# Patient Record
Sex: MALE | Race: WHITE
[De-identification: names, ages, dates, MRNs, and addresses within clinical notes are randomized per-mention and may not be internally consistent; named-entity substitution may affect disease eponyms.]

---

## 2018-02-07 ENCOUNTER — HOSPITAL ENCOUNTER (OUTPATIENT)
Dept: HOSPITAL 74 - JASU-SURG | Age: 68
Discharge: HOME | End: 2018-02-07
Attending: ORTHOPAEDIC SURGERY
Payer: COMMERCIAL

## 2018-02-07 VITALS — TEMPERATURE: 98.1 F

## 2018-02-07 VITALS — SYSTOLIC BLOOD PRESSURE: 117 MMHG | HEART RATE: 86 BPM | DIASTOLIC BLOOD PRESSURE: 77 MMHG

## 2018-02-07 VITALS — BODY MASS INDEX: 26.6 KG/M2

## 2018-02-07 DIAGNOSIS — S83.232A: Primary | ICD-10-CM
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
## 2018-02-07 DIAGNOSIS — M17.12: ICD-10-CM
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
## 2018-02-07 PROCEDURE — 0SBD4ZZ EXCISION OF LEFT KNEE JOINT, PERCUTANEOUS ENDOSCOPIC APPROACH: ICD-10-PCS | Performed by: ORTHOPAEDIC SURGERY

## 2018-02-07 NOTE — OP
Operative Note





- Note:


Operative Date: 02/07/18 ([facility])


Pre-Operative Diagnosis: left knee internal derangement


Operation: left knee arthroscopy ith PMM, debridement chondroplasty trochlea, 

synovectomy


Post-Operative Diagnosis: Same as Pre-op


Surgeon: Derrell Alexandra


Anesthesia: General, Local


Specimens Removed: shavings


Estimated Blood Loss (mls): 5


Operative Report Dictated: Yes

## 2018-02-07 NOTE — HP
Satellite [facility]





- Chief Complaint


Chief Complaint: left knee pain





- Past Medical History


Allergies/Adverse Reactions: 


 Allergies











Allergy/AdvReac Type Severity Reaction Status Date / Time


 


No Known Allergies Allergy   Verified 02/07/18 06:41














- Current Medications


Current Medications: 


 Home Medications











 Medication  Instructions  Recorded


 


Rosuvastatin [Crestor -] 10 mg PO DAILY 02/02/18


 


Zolpidem Tartrate [Ambien] 10 mg PO PRN PRN 02/02/18


 


Hydrocodone/Acetaminophen [Norco 1 each PO Q6H PRN #20 tablet MDD 4 02/07/18





5-325 Tablet]  














Satellite Physical Exam





- Physical Examination


Vital Signs: 


 Vital Signs











 Period  Temp  Pulse  Resp  BP Sys/Jimenes  Pulse Ox


 


 Last 24 Hr  98.0 F  77  20  137/70  97











General Appearance: Well Nourished, Well Developed, Alert & Oriented x3


ENT: Clear


Lung: Normal air movement


Heart: Regular rate & rhythm


Extremities: Other (left knee- + swelling, + ttp, dec rom, nvi MRI + mmt)


Neurological: Intact, Alert, Oriented





Satellite Impression/Plan





- Impression/Plan


Impression: left knee internal derangement


Operative Procedure: left knee arthroscopy


Date to be Performed: 02/07/18

## 2018-02-07 NOTE — OP
DATE OF OPERATION:  02/07/2018 

 

PREOPERATIVE DIAGNOSIS:  Left knee medial meniscus tear and osteoarthritis.

 

POSTOPERATIVE DIAGNOSIS:  Left knee medial meniscus tear and osteoarthritis.

 

SURGICAL PROCEDURE:  Left knee arthroscopy, partial medial meniscectomy, and

debridement chondroplasty.  

 

SURGEON:  Derrell Mccartney MD 

 

ASSISTANT:  None.

 

ANESTHETIST:  _____ 

 

ANESTHESIA:  LMA and intraarticular injection of 20 mL 0.5% Marcaine.

 

DRAINS:  None.

 

SPECIMENS:  Arthroscopic shavings.  

 

BLOOD LOSS:  None.

 

BLOOD GIVEN:  None.

 

FLUID REPLACEMENT:  500 mL.  

 

INDICATION FOR PROCEDURE:  This patient is a 68-year-old male with a preoperative

diagnosis of left knee pain and medial meniscus tear and osteoarthritis.  After

understanding the potential risks, complications, alternatives, and benefits to

surgery versus nonsurgical treatment, the patient elected to undergo this procedure. 

 

 

DESCRIPTION OF PROCEDURE:  Patient was brought to the operating room, peripheral IV

placed, IV sedation given.  IV Ancef 2 g was given.  LMA anesthesia was induced. 

Ample Webril was placed around the left thigh, and the tourniquet was applied.  The

styrofoam ring was applied.  The patient's left lower extremity was placed into

C-clamp _____ with ample padding throughout.  Was prepped and draped in sterile

fashion.  It was elevated and exsanguinated with an Esmarch bandage, tourniquet

inflated to 275 mmHg.  

 

A superomedial outflow portal was established.  A lateral portal was established.  An

arthroscope was introduced into the joint.  Under direct visualization a medial

portal was established.  The patient was seen to have significant grade 4

osteoarthritis of the medial femoral condyle and the medial tibial plateau and a

complex tear of the posterior horn of the medial meniscus.  A curved shaver was

introduced into the joint.  A partial medial meniscectomy was performed and a

debridement chondroplasty in the medial compartment.  Photographs were taken before

and after.  

 

Intercondylar notch had a lot of synovitis and scar tissue.  This was removed.  The

lateral compartment, the meniscus looked good.  There was grade 2 osteoarthritis of

the lateral tibial plateau, grade 1 changes of the lateral femoral condyle, but

overall lateral compartment looked good.  

 

The patellofemoral joint had significant scar tissue from 2 previous quadriceps

tendon repair surgeries and significant grade 4 trochlear osteoarthritis.  A

debridement chondroplasty was performed here as well as a synovectomy and removal of

scar tissue.  The area was copiously irrigated and washed out, all instrumentation

and excess saline removed.  The arthroscopy portals were closed with 3-0 nylon

sutures.  The area was then washed and dried, covered with Xeroform, 4 x 4 gauze,

Webril, and Ace bandage.  Total tourniquet time was 20 minutes.  There were no

complications during the case and the patient tolerated the procedure well and was

brought to the ambulatory recovery room in stable condition.  

 

 

DERRELL MCCARTNEY M.D.

 

[id number]

DD: 02/07/2018 08:57

DT: 02/07/2018 09:43

Job #:  [id number]

## 2018-02-08 NOTE — PATH
Surgical Pathology Report



Patient Name:  JAMARI CARDOOZ

Accession #:  

Med. Rec. #:  [id number]                                                        

   /Age/Gender:  1950 (Age: 68) / M

Account:  [id number]                                                          

             Location: [facility] SURGICAL

Taken:  2018

Received:  2018

Reported:  2018

Physicians:  Derrell Alexandra M.D.

  



Specimen(s) Received

 LEF TKNEE SHAVINGS 





Clinical History

Meniscus tear left knee







Final Diagnosis

KNEE, LEFT, ARTHROSCOPIC SHAVING: 

FIBROCARTILAGE WITH MYXOID DEGENERATIVE CHANGES, ALONG WITH PORTIONS OF

SYNOVIUM.





***Electronically Signed***

Aaron Stoddard M.D.





Gross Description

Received in formalin, labeled "left knee shavings," is a 4.5 x 4.2 x 0.3 cm.

aggregate of tan-yellow soft tissue fragments. A representative portion is

submitted in one cassette.

/2018

## 2018-03-14 ENCOUNTER — HOSPITAL ENCOUNTER (OUTPATIENT)
Dept: HOSPITAL 74 - JASU-SURG | Age: 68
Discharge: HOME | End: 2018-03-14
Attending: ORTHOPAEDIC SURGERY
Payer: COMMERCIAL

## 2018-03-14 VITALS — HEART RATE: 90 BPM

## 2018-03-14 VITALS — DIASTOLIC BLOOD PRESSURE: 71 MMHG | SYSTOLIC BLOOD PRESSURE: 125 MMHG

## 2018-03-14 VITALS — TEMPERATURE: 98.5 F

## 2018-03-14 VITALS — BODY MASS INDEX: 27.3 KG/M2

## 2018-03-14 DIAGNOSIS — M75.42: Primary | ICD-10-CM

## 2018-03-14 DIAGNOSIS — M75.02: ICD-10-CM

## 2018-03-14 DIAGNOSIS — M75.102: ICD-10-CM

## 2018-03-14 PROCEDURE — 0RNK4ZZ RELEASE LEFT SHOULDER JOINT, PERCUTANEOUS ENDOSCOPIC APPROACH: ICD-10-PCS | Performed by: ORTHOPAEDIC SURGERY

## 2018-03-14 PROCEDURE — 0RNKXZZ RELEASE LEFT SHOULDER JOINT, EXTERNAL APPROACH: ICD-10-PCS | Performed by: ORTHOPAEDIC SURGERY

## 2018-03-14 PROCEDURE — 0LQ24ZZ REPAIR LEFT SHOULDER TENDON, PERCUTANEOUS ENDOSCOPIC APPROACH: ICD-10-PCS | Performed by: ORTHOPAEDIC SURGERY

## 2018-03-14 RX ADMIN — ONDANSETRON PRN MG: 2 INJECTION INTRAMUSCULAR; INTRAVENOUS at 15:40

## 2018-03-14 RX ADMIN — ONDANSETRON PRN MG: 2 INJECTION INTRAMUSCULAR; INTRAVENOUS at 16:00

## 2018-03-14 NOTE — HP
Satellite [facility]





- Chief Complaint


Chief Complaint: left shoulder pain, decreased ROM


History of Present Illness: left shoulder impingement, RTC tear


History Source: Patient


Limitations to Obtaining History: No Limitations





- Past Medical History


Allergies/Adverse Reactions: 


 Allergies











Allergy/AdvReac Type Severity Reaction Status Date / Time


 


No Known Allergies Allergy   Verified 03/13/18 11:42














- Current Medications


Current Medications: 


 Home Medications











 Medication  Instructions  Recorded


 


Rosuvastatin [Crestor -] 10 mg PO DAILY 02/02/18


 


Zolpidem Tartrate [Ambien] 10 mg PO PRN PRN 02/02/18














Satellite Physical Exam





- Physical Examination


General Appearance: Well Nourished


ENT: Clear


Lung: Clear to auscultation


Heart: Regular rate & rhythm


Breasts: Soft


Abdomen: Soft


Extremities: No edema





Satellite Impression/Plan





- Impression/Plan


Impression: left shoulder impingement, RTC tear


Operative Procedure: left shoulder arthoscopy, decompression, RTC repair


Date to be Performed: 03/14/18

## 2018-03-14 NOTE — OP
DATE OF OPERATION:  03/14/2018

 

PREOPERATIVE DIAGNOSES:  Left shoulder impingement syndrome, adhesive capsulitis, and

rotator cuff tear.

 

POSTOPERATIVE DIAGNOSES:  Left shoulder impingement syndrome, adhesive capsulitis,

and rotator cuff tear.

 

PROCEDURE:  Left shoulder arthroscopy, subacromial decompression, manipulation under

anesthesia, and arthroscopic rotator cuff repair.

 

SURGEON:  Benjy Mccartney MD

 

ASSISTANT:  SOLIS Hernandez

 

ANESTHESIOLOGIST:  _____, CRNA, with Ildefonso Fernando MD

 

ANESTHESIA:  Left interscalene block and LMA anesthesia.

 

BLOOD LOSS:  Minimal.

 

BLOOD GIVEN:  None.

 

FLUID REPLACEMENTS:  700 mL

 

SPECIMEN:  Arthroscopic shavings.

 

DRAINS:  None.

 

COMPLICATIONS:  None.

 

INDICATION FOR PROCEDURE:  This patient is a 68-year-old male with preoperative

diagnosis of left shoulder subacromial impingement, adhesive capsulitis, and a

rotator cuff tear.  After understanding the potential risks, complications,

alternatives, and benefits of surgery versus nonsurgical treatment, the patient

elected to undergo this procedure.

 

DESCRIPTION OF PROCEDURE:  Patient was brought to the operating room.  Peripheral IV

placed.  IV sedation given.  Next, 1 gram of IV Ancef was given.  A left interscalene

block was performed.  LMA anesthesia was induced.  He was placed in the beach chair

position with ample padding throughout.  The left upper extremity was prepped and

draped in sterile fashion.  The bony landmarks were marked out with a marking pen.

 

A posterior portal was established.  A diagnostic arthroscopy was performed.  Patient

clearly had a full-thickness rotator cuff tear, but it was a crescent shape and

relatively small.  Otherwise these structures in the glenohumeral joint were normal.

 

We turned our attention to the subacromial space.  Lateral portal was established

under direct visualization using a spinal needle.  An incision was made and green

cannula introduced in the subacromial space and arthroscopic bursectomy was performed

with the ArthroCare wand.  After this extensive debridement, we were able to

visualize _____ surface of the rotator cuff, which clearly had a full-thickness,

crescent-shaped tear of the anterior portion of the supraspinatus.  The edges of the

rotator cuff were debrided and mildly cauterized.  The landing bed was debrided of

soft tissue and mildly decorticated and roughed up with a rasp.  The shaver was

introduced to remove all debris.  Next, a 5.5-mm oval ryan was used to take down the

moderate-size subacromial spur.  Once the bony decompression was done, a shaver was

introduced into this area, and the decompression was fine tuned.  Next, we used the

Scorpion needle passer to put in 3 FiberWire sutures.  I was able to pull it down,

and under direct visualization, I could see that it was well mobilized and came down

to its landing bed without any problems.  There was no room for any more FiberWires. 

Next, using the all-arthroscopic technique, we put in 1 Arthrex SwiveLock suture

anchor.  Photographs were taken throughout.  The tails were cut.  It came down quite

nicely.  The humeral head and rotator cuff moved as a unit under direct

visualization.  The area was copiously irrigated and washed out.  All instrumentation

removed.  Excess saline removed.  The arthroscopy portals were closed with 3-0 nylon

sutures.  The area was then washed and dried and covered with Aquacel.  He was put

into a shoulder immobilizer.  Total operative time was about 40 minutes.  There were

no complications during the case.  Patient tolerated the procedure quite well, was

brought to the ambulatory recovery room in stable condition.

 

 

BENJY MCCARTNEY M.D.

 

[id number]

DD: 03/14/2018 15:02

DT: 03/14/2018 21:52

Job #:  [id number]

## 2018-03-14 NOTE — OP
Operative Note





- Note:


Operative Date: 03/14/18


Pre-Operative Diagnosis: left shoulder impingement, adhesive capsulitis, RTC 

tear


Operation: left shoulder arthroscopy, decompression, manipulation under 

anesthesia, arthroscopic RTC repair


Implants: Arthrex Swivel Lock anchor, fiber wire x 3


Surgeon: Derrell Alexandra


Assistant: Calvin Thomas


Anesthesiologist/CRNA: Shantel Walker


Anesthesia: General, Local


Specimens Removed: shavings


Estimated Blood Loss (mls): 0


Drains, Volume Out (mls): 0


Blood Volume Replaced (mls): 0


Fluid Volume Replaced (mls): 700


Operative Report Dictated: Yes

## 2018-03-16 NOTE — PATH
Surgical Pathology Report



Patient Name:  JAMARI CARDOZO

Accession #:  [id number]

Med. Rec. #:  [id number]                                                        

   /Age/Gender:  1950 (Age: 68) / M

Account:  [id number]                                                          

             Location: [facility] SURGICAL

Taken:  3/14/2018

Received:  3/15/2018

Reported:  3/16/2018

Physicians:  Derrell Alexandra M.D.

  



Specimen(s) Received

 LEFT SHOULDER SHAVINGS 





Clinical History

Left shoulder tear







Final Diagnosis

SHOULDER SHAVINGS, LEFT, ARTHROSCOPY AND ROTATOR CUFF REPAIR:

FRAGMENTS OF BENIGN CARTILAGE, DENSE FIBROCONNECTIVE TISSUE, ADIPOSE TISSUE, AND

SKELETAL MUSCLE.





***Electronically Signed***

Shena Bajwa M.D.





Gross Description

Received in formalin, labeled "left shoulder shavings," is a 3.8 x 3.5 x 0.3 cm.

aggregate of tan-yellow soft tissue fragments. A representative portion is

submitted in one cassette.

/3/15/2018



saudi/3/15/2018